# Patient Record
Sex: MALE | Race: WHITE | NOT HISPANIC OR LATINO | Employment: FULL TIME | URBAN - METROPOLITAN AREA
[De-identification: names, ages, dates, MRNs, and addresses within clinical notes are randomized per-mention and may not be internally consistent; named-entity substitution may affect disease eponyms.]

---

## 2024-10-01 ENCOUNTER — HOSPITAL ENCOUNTER (EMERGENCY)
Facility: HOSPITAL | Age: 29
Discharge: HOME | End: 2024-10-01
Attending: STUDENT IN AN ORGANIZED HEALTH CARE EDUCATION/TRAINING PROGRAM
Payer: COMMERCIAL

## 2024-10-01 VITALS
BODY MASS INDEX: 25.06 KG/M2 | SYSTOLIC BLOOD PRESSURE: 119 MMHG | WEIGHT: 185 LBS | HEART RATE: 67 BPM | TEMPERATURE: 99.7 F | RESPIRATION RATE: 16 BRPM | DIASTOLIC BLOOD PRESSURE: 77 MMHG | OXYGEN SATURATION: 97 % | HEIGHT: 72 IN

## 2024-10-01 DIAGNOSIS — R21 RASH: Primary | ICD-10-CM

## 2024-10-01 PROCEDURE — 99281 EMR DPT VST MAYX REQ PHY/QHP: CPT

## 2024-10-01 ASSESSMENT — PAIN - FUNCTIONAL ASSESSMENT: PAIN_FUNCTIONAL_ASSESSMENT: 0-10

## 2024-10-01 ASSESSMENT — PAIN SCALES - GENERAL: PAINLEVEL_OUTOF10: 0 - NO PAIN

## 2024-10-01 ASSESSMENT — COLUMBIA-SUICIDE SEVERITY RATING SCALE - C-SSRS
2. HAVE YOU ACTUALLY HAD ANY THOUGHTS OF KILLING YOURSELF?: NO
6. HAVE YOU EVER DONE ANYTHING, STARTED TO DO ANYTHING, OR PREPARED TO DO ANYTHING TO END YOUR LIFE?: NO
1. IN THE PAST MONTH, HAVE YOU WISHED YOU WERE DEAD OR WISHED YOU COULD GO TO SLEEP AND NOT WAKE UP?: NO

## 2024-10-01 NOTE — ED TRIAGE NOTES
Patient presents to the ED with concerns for a red line on his right forearm that he noticed today without any injury.

## 2024-10-02 NOTE — ED PROVIDER NOTES
Chief Complaint: Rash  HPI: This is a 28-year-old male, presenting to the emergency department for a linear rash on his right forearm which began just prior to arrival.  Patient states that he was at work today, however is unaware of any injury or trauma at work, denies any scratches or abrasions that he is aware of.  He was advised to come to the emergency department for evaluation of this rash on his forearm by a friend, and so presents to the emergency department.  He denies any other symptoms at this time, denies any fevers, chills, chest pain, shortness of breath, arm tingling or numbness.    No past medical history on file.   No past surgical history on file.    Physical Exam  Vitals and nursing note reviewed.   Constitutional:       Appearance: Normal appearance.   HENT:      Head: Normocephalic and atraumatic.      Mouth/Throat:      Mouth: Mucous membranes are moist.   Eyes:      Extraocular Movements: Extraocular movements intact.   Pulmonary:      Effort: Pulmonary effort is normal.   Abdominal:      General: Abdomen is flat.      Palpations: Abdomen is soft.   Skin:     General: Skin is warm.      Comments: Very thin faint erythematous line along the volar aspect of the right forearm, consistent with linear abrasion.   Neurological:      General: No focal deficit present.      Mental Status: He is alert.   Psychiatric:         Mood and Affect: Mood normal.            ED Course/MDM  Diagnoses as of 10/02/24 1534   Rash         This is a 28 y.o. male presenting to the ED for evaluation of a rash to the right forearm which occurred just prior to arrival.  On physical exam, the patient is resting comfortably in the bed, no acute distress.  There is a very thin faint red line to the volar aspect of the right forearm that appears to be an abrasion.  I have a very low suspicion for cellulitis, as there are no skin breaks or lacerations.  This does appear consistent with abrasion despite patient's lack of  history of scratching his arm on anything.  Patient was advised to monitor the area, and if he gets worse, to represent to the emergency department.  I do not feel that any lab work or imaging is indicated at this time, and I do not feel that any antibiotics or other prescriptions are indicated.  Patient was discharged home in stable condition.    Final Impression  1.  Rash  Disposition/Plan: Discharge home  Condition at disposition: Stable.     Naye Mclaughlin DO  Emergency Medicine Physician     Naye Mclaughlin DO  10/02/24 153